# Patient Record
Sex: FEMALE | Race: WHITE | Employment: OTHER | URBAN - NONMETROPOLITAN AREA
[De-identification: names, ages, dates, MRNs, and addresses within clinical notes are randomized per-mention and may not be internally consistent; named-entity substitution may affect disease eponyms.]

---

## 2017-10-27 ENCOUNTER — HOSPITAL ENCOUNTER (OUTPATIENT)
Dept: MAMMOGRAPHY | Age: 67
Discharge: HOME | End: 2017-10-27
Payer: COMMERCIAL

## 2017-10-27 DIAGNOSIS — Z12.39 BREAST CANCER SCREENING: ICD-10-CM

## 2017-10-27 PROCEDURE — G0202 SCR MAMMO BI INCL CAD: HCPCS

## 2017-11-03 ENCOUNTER — HOSPITAL ENCOUNTER (OUTPATIENT)
Dept: WOMENS IMAGING | Age: 67
Discharge: HOME | End: 2017-11-03
Payer: COMMERCIAL

## 2017-11-03 DIAGNOSIS — R92.1 BREAST CALCIFICATIONS: ICD-10-CM

## 2017-11-03 PROCEDURE — G0279 TOMOSYNTHESIS, MAMMO: HCPCS

## 2018-10-12 ENCOUNTER — HOSPITAL ENCOUNTER (OUTPATIENT)
Dept: MAMMOGRAPHY | Age: 68
Discharge: HOME | End: 2018-10-12
Payer: COMMERCIAL

## 2018-10-12 DIAGNOSIS — Z12.39 BREAST CANCER SCREENING: ICD-10-CM

## 2018-10-12 PROCEDURE — 77067 SCR MAMMO BI INCL CAD: CPT

## 2019-02-06 ENCOUNTER — NURSE TRIAGE (OUTPATIENT)
Dept: ADMINISTRATIVE | Age: 69
End: 2019-02-06

## 2019-10-22 ENCOUNTER — HOSPITAL ENCOUNTER (OUTPATIENT)
Dept: MAMMOGRAPHY | Age: 69
Discharge: HOME | End: 2019-10-22
Payer: MEDICARE

## 2019-10-22 DIAGNOSIS — Z12.39 BREAST CANCER SCREENING: ICD-10-CM

## 2019-10-22 PROCEDURE — 77063 BREAST TOMOSYNTHESIS BI: CPT

## 2020-10-23 ENCOUNTER — HOSPITAL ENCOUNTER (OUTPATIENT)
Dept: MAMMOGRAPHY | Age: 70
Discharge: HOME | End: 2020-10-23
Payer: MEDICARE

## 2020-10-23 PROCEDURE — 77063 BREAST TOMOSYNTHESIS BI: CPT

## 2021-08-26 RX ORDER — ATORVASTATIN CALCIUM 20 MG/1
20 TABLET, FILM COATED ORAL NIGHTLY
COMMUNITY
Start: 2021-08-04

## 2021-08-26 RX ORDER — LISINOPRIL 20 MG/1
20 TABLET ORAL 2 TIMES DAILY
COMMUNITY
Start: 2021-07-01 | End: 2021-10-21

## 2021-08-31 ENCOUNTER — ANESTHESIA EVENT (OUTPATIENT)
Dept: OPERATING ROOM | Age: 71
End: 2021-08-31
Payer: MEDICARE

## 2021-08-31 ENCOUNTER — HOSPITAL ENCOUNTER (OUTPATIENT)
Age: 71
Setting detail: OUTPATIENT SURGERY
Discharge: HOME | End: 2021-08-31
Attending: OPHTHALMOLOGY | Admitting: OPHTHALMOLOGY
Payer: MEDICARE

## 2021-08-31 ENCOUNTER — ANESTHESIA (OUTPATIENT)
Dept: OPERATING ROOM | Age: 71
End: 2021-08-31
Payer: MEDICARE

## 2021-08-31 VITALS
HEART RATE: 61 BPM | BODY MASS INDEX: 32.62 KG/M2 | OXYGEN SATURATION: 98 % | HEIGHT: 66 IN | DIASTOLIC BLOOD PRESSURE: 60 MMHG | WEIGHT: 203 LBS | TEMPERATURE: 97 F | RESPIRATION RATE: 20 BRPM | SYSTOLIC BLOOD PRESSURE: 132 MMHG

## 2021-08-31 VITALS — OXYGEN SATURATION: 99 % | SYSTOLIC BLOOD PRESSURE: 151 MMHG | DIASTOLIC BLOOD PRESSURE: 66 MMHG

## 2021-08-31 PROBLEM — H35.342 MACULAR HOLE, LEFT EYE: Status: ACTIVE | Noted: 2021-08-31

## 2021-08-31 LAB
GFR NON-AFRICAN AMERICAN: 60 ML/MIN
GFR SERPL CREATININE-BSD FRML MDRD: >60 ML/MIN
GFR SERPL CREATININE-BSD FRML MDRD: ABNORMAL ML/MIN/{1.73_M2}
GLUCOSE BLD-MCNC: 104 MG/DL (ref 74–100)
POC CREATININE: 0.93 MG/DL (ref 0.51–1.19)

## 2021-08-31 PROCEDURE — 3700000001 HC ADD 15 MINUTES (ANESTHESIA): Performed by: OPHTHALMOLOGY

## 2021-08-31 PROCEDURE — 3700000000 HC ANESTHESIA ATTENDED CARE: Performed by: OPHTHALMOLOGY

## 2021-08-31 PROCEDURE — 6360000002 HC RX W HCPCS: Performed by: OPHTHALMOLOGY

## 2021-08-31 PROCEDURE — 2500000003 HC RX 250 WO HCPCS: Performed by: NURSE ANESTHETIST, CERTIFIED REGISTERED

## 2021-08-31 PROCEDURE — 3600000014 HC SURGERY LEVEL 4 ADDTL 15MIN: Performed by: OPHTHALMOLOGY

## 2021-08-31 PROCEDURE — 82565 ASSAY OF CREATININE: CPT

## 2021-08-31 PROCEDURE — 6360000002 HC RX W HCPCS: Performed by: NURSE ANESTHETIST, CERTIFIED REGISTERED

## 2021-08-31 PROCEDURE — 2580000003 HC RX 258: Performed by: ANESTHESIOLOGY

## 2021-08-31 PROCEDURE — 6370000000 HC RX 637 (ALT 250 FOR IP): Performed by: OPHTHALMOLOGY

## 2021-08-31 PROCEDURE — 3600000004 HC SURGERY LEVEL 4 BASE: Performed by: OPHTHALMOLOGY

## 2021-08-31 PROCEDURE — 82947 ASSAY GLUCOSE BLOOD QUANT: CPT

## 2021-08-31 PROCEDURE — 2500000003 HC RX 250 WO HCPCS: Performed by: OPHTHALMOLOGY

## 2021-08-31 PROCEDURE — 93005 ELECTROCARDIOGRAM TRACING: CPT | Performed by: ANESTHESIOLOGY

## 2021-08-31 PROCEDURE — 2580000003 HC RX 258: Performed by: OPHTHALMOLOGY

## 2021-08-31 PROCEDURE — 7100000041 HC SPAR PHASE II RECOVERY - ADDTL 15 MIN: Performed by: OPHTHALMOLOGY

## 2021-08-31 PROCEDURE — 2709999900 HC NON-CHARGEABLE SUPPLY: Performed by: OPHTHALMOLOGY

## 2021-08-31 PROCEDURE — 7100000040 HC SPAR PHASE II RECOVERY - FIRST 15 MIN: Performed by: OPHTHALMOLOGY

## 2021-08-31 RX ORDER — SODIUM CHLORIDE 0.9 % (FLUSH) 0.9 %
SYRINGE (ML) INJECTION PRN
Status: DISCONTINUED | OUTPATIENT
Start: 2021-08-31 | End: 2021-08-31 | Stop reason: ALTCHOICE

## 2021-08-31 RX ORDER — BALANCED SALT SOLUTION ENRICHED WITH BICARBONATE, DEXTROSE, AND GLUTATHIONE
KIT INTRAOCULAR PRN
Status: DISCONTINUED | OUTPATIENT
Start: 2021-08-31 | End: 2021-08-31 | Stop reason: ALTCHOICE

## 2021-08-31 RX ORDER — FENTANYL CITRATE 50 UG/ML
25 INJECTION, SOLUTION INTRAMUSCULAR; INTRAVENOUS EVERY 5 MIN PRN
Status: DISCONTINUED | OUTPATIENT
Start: 2021-08-31 | End: 2021-08-31 | Stop reason: HOSPADM

## 2021-08-31 RX ORDER — CYCLOPENTOLATE HYDROCHLORIDE 10 MG/ML
1 SOLUTION/ DROPS OPHTHALMIC EVERY 5 MIN PRN
Status: COMPLETED | OUTPATIENT
Start: 2021-08-31 | End: 2021-08-31

## 2021-08-31 RX ORDER — PROPOFOL 10 MG/ML
INJECTION, EMULSION INTRAVENOUS PRN
Status: DISCONTINUED | OUTPATIENT
Start: 2021-08-31 | End: 2021-08-31 | Stop reason: SDUPTHER

## 2021-08-31 RX ORDER — DEXTROSE MONOHYDRATE 25 G/50ML
INJECTION, SOLUTION INTRAVENOUS PRN
Status: DISCONTINUED | OUTPATIENT
Start: 2021-08-31 | End: 2021-08-31 | Stop reason: ALTCHOICE

## 2021-08-31 RX ORDER — CEFAZOLIN SODIUM 1 G/3ML
INJECTION, POWDER, FOR SOLUTION INTRAMUSCULAR; INTRAVENOUS PRN
Status: DISCONTINUED | OUTPATIENT
Start: 2021-08-31 | End: 2021-08-31 | Stop reason: ALTCHOICE

## 2021-08-31 RX ORDER — PHENYLEPHRINE HYDROCHLORIDE 100 MG/ML
1 SOLUTION/ DROPS OPHTHALMIC EVERY 5 MIN PRN
Status: COMPLETED | OUTPATIENT
Start: 2021-08-31 | End: 2021-08-31

## 2021-08-31 RX ORDER — FENTANYL CITRATE 50 UG/ML
50 INJECTION, SOLUTION INTRAMUSCULAR; INTRAVENOUS EVERY 5 MIN PRN
Status: DISCONTINUED | OUTPATIENT
Start: 2021-08-31 | End: 2021-08-31 | Stop reason: HOSPADM

## 2021-08-31 RX ORDER — SODIUM CHLORIDE, SODIUM LACTATE, POTASSIUM CHLORIDE, CALCIUM CHLORIDE 600; 310; 30; 20 MG/100ML; MG/100ML; MG/100ML; MG/100ML
INJECTION, SOLUTION INTRAVENOUS CONTINUOUS
Status: DISCONTINUED | OUTPATIENT
Start: 2021-08-31 | End: 2021-08-31 | Stop reason: HOSPADM

## 2021-08-31 RX ORDER — ERYTHROMYCIN 5 MG/G
OINTMENT OPHTHALMIC PRN
Status: DISCONTINUED | OUTPATIENT
Start: 2021-08-31 | End: 2021-08-31 | Stop reason: ALTCHOICE

## 2021-08-31 RX ORDER — LIDOCAINE HYDROCHLORIDE 10 MG/ML
INJECTION, SOLUTION EPIDURAL; INFILTRATION; INTRACAUDAL; PERINEURAL PRN
Status: DISCONTINUED | OUTPATIENT
Start: 2021-08-31 | End: 2021-08-31 | Stop reason: SDUPTHER

## 2021-08-31 RX ORDER — CYCLOPENTOLATE HYDROCHLORIDE 10 MG/ML
SOLUTION/ DROPS OPHTHALMIC PRN
Status: DISCONTINUED | OUTPATIENT
Start: 2021-08-31 | End: 2021-08-31 | Stop reason: ALTCHOICE

## 2021-08-31 RX ORDER — ONDANSETRON 2 MG/ML
4 INJECTION INTRAMUSCULAR; INTRAVENOUS
Status: DISCONTINUED | OUTPATIENT
Start: 2021-08-31 | End: 2021-08-31 | Stop reason: HOSPADM

## 2021-08-31 RX ORDER — MEPERIDINE HYDROCHLORIDE 50 MG/ML
12.5 INJECTION INTRAMUSCULAR; INTRAVENOUS; SUBCUTANEOUS EVERY 5 MIN PRN
Status: DISCONTINUED | OUTPATIENT
Start: 2021-08-31 | End: 2021-08-31 | Stop reason: HOSPADM

## 2021-08-31 RX ORDER — INDOCYANINE GREEN AND WATER 25 MG
KIT INJECTION PRN
Status: DISCONTINUED | OUTPATIENT
Start: 2021-08-31 | End: 2021-08-31 | Stop reason: ALTCHOICE

## 2021-08-31 RX ADMIN — SODIUM CHLORIDE, POTASSIUM CHLORIDE, SODIUM LACTATE AND CALCIUM CHLORIDE: 600; 310; 30; 20 INJECTION, SOLUTION INTRAVENOUS at 09:10

## 2021-08-31 RX ADMIN — LIDOCAINE HYDROCHLORIDE 50 MG: 10 INJECTION, SOLUTION EPIDURAL; INFILTRATION; INTRACAUDAL; PERINEURAL at 10:20

## 2021-08-31 RX ADMIN — PROPOFOL 20 MG: 10 INJECTION, EMULSION INTRAVENOUS at 10:23

## 2021-08-31 RX ADMIN — PHENYLEPHRINE HYDROCHLORIDE 1 DROP: 100 SOLUTION/ DROPS OPHTHALMIC at 09:04

## 2021-08-31 RX ADMIN — PROPOFOL 50 MG: 10 INJECTION, EMULSION INTRAVENOUS at 10:20

## 2021-08-31 RX ADMIN — PHENYLEPHRINE HYDROCHLORIDE 1 DROP: 100 SOLUTION/ DROPS OPHTHALMIC at 09:09

## 2021-08-31 RX ADMIN — CYCLOPENTOLATE HYDROCHLORIDE 1 DROP: 10 SOLUTION OPHTHALMIC at 09:12

## 2021-08-31 RX ADMIN — CYCLOPENTOLATE HYDROCHLORIDE 1 DROP: 10 SOLUTION OPHTHALMIC at 09:23

## 2021-08-31 RX ADMIN — CYCLOPENTOLATE HYDROCHLORIDE 1 DROP: 10 SOLUTION OPHTHALMIC at 09:19

## 2021-08-31 RX ADMIN — GENTAMICIN, PREDNISOLONE ACETATE 1 DROP: 3; 10 SUSPENSION/ DROPS OPHTHALMIC at 09:26

## 2021-08-31 RX ADMIN — PHENYLEPHRINE HYDROCHLORIDE 1 DROP: 100 SOLUTION/ DROPS OPHTHALMIC at 08:58

## 2021-08-31 RX ADMIN — PROPOFOL 20 MG: 10 INJECTION, EMULSION INTRAVENOUS at 10:21

## 2021-08-31 ASSESSMENT — PAIN SCALES - GENERAL
PAINLEVEL_OUTOF10: 0

## 2021-08-31 ASSESSMENT — PULMONARY FUNCTION TESTS
PIF_VALUE: 0
PIF_VALUE: 0

## 2021-08-31 ASSESSMENT — PAIN - FUNCTIONAL ASSESSMENT: PAIN_FUNCTIONAL_ASSESSMENT: 0-10

## 2021-08-31 NOTE — BRIEF OP NOTE
Brief Postoperative Note      Patient: Arely Priest  YOB: 1950  MRN: [id number]    Date of Procedure: 8/31/2021    Pre-Op Diagnosis: MACULAR HOLE    Post-Op Diagnosis: Same       Procedure(s):  VITRECTOMY 25 GAUGE, MEMBRANE PEEL, AIR FLUID EXCHANGE, ICG, AIR GAS EXCHANGE WITH 20% SF6    Surgeon(s):  Denise Castrejon MD    Assistant:  * No surgical staff found *    Anesthesia: Monitor Anesthesia Care    Estimated Blood Loss (mL): Minimal    Complications: None    Specimens:   * No specimens in log *    Implants:  * No implants in log *      Drains: * No LDAs found *    Findings: Same    Electronically signed by Denise Castrejon MD on 8/31/2021 at 11:05 AM

## 2021-08-31 NOTE — H&P
History and Physical    Pt Name: Vik Mukherjee  MRN: [id number]  YOB: 1950  Date of evaluation: 2021  Primary Care Physician: Trevin Mock    SUBJECTIVE:   History of Chief Complaint:    Vik Mukherjee is a 72 y.o. female who is scheduled today for left vitrectomy. She reports having a partial macular hole. She first noticed blurry vision at the end of 2021. She has since had bilateral cataract removal in 2021, and says this has cleared her vision some, but still has distorted vision on the left at times. Allergies  has No Known Allergies. Medications  Prior to Admission medications    Medication Sig Start Date End Date Taking? Authorizing Provider   ASPIRIN 81 PO Take 81 mg by mouth as needed   Yes Historical Provider, MD   atorvastatin (LIPITOR) 20 MG tablet Take 20 mg by mouth daily 21  Yes Historical Provider, MD   lisinopril (PRINIVIL;ZESTRIL) 20 MG tablet Take 20 mg by mouth 2 times daily 21  Yes Historical Provider, MD   FIBER COMPLETE PO Take by mouth as needed   Yes Historical Provider, MD   Probiotic Product (PROBIOTIC-10 PO) Take by mouth   Yes Historical Provider, MD     Past Medical History    has a past medical history of Cancer ([address] Hyperlipidemia, Hypertension, Pain of right heel, Partial thickness macular hole of left eye, Snoring, Vision changes, Wears glasses, and Wellness examination. Past Surgical History   has a past surgical history that includes Cataract removal (Bilateral); Total hip arthroplasty (Left); Colonoscopy; and Appendectomy. Social History   reports that she quit smoking about 2 years ago. Her smoking use included cigarettes. She started smoking about 50 years ago. She has never used smokeless tobacco.   reports previous alcohol use. reports no history of drug use.   Marital Status single  Children one living, one   Occupation retired clerical  Family History  Family Status   Relation Name Status    Mother      Father MEMBRANE PEEL, GAS FLUID EXCHANGE, ICG - Left    JIM FOWLER - CNP   Electronically signed 8/31/2021 at 9:16 AM

## 2021-08-31 NOTE — ANESTHESIA POSTPROCEDURE EVALUATION
Department of Anesthesiology  Postprocedure Note    Patient: Arely Priest  MRN: [id number]  YOB: 1950  Date of evaluation: 8/31/2021  Time:  1:30 PM     Procedure Summary     Date: 08/31/21 Room / Location: [address]    Anesthesia Start: 1466 Anesthesia Stop: 1112    Procedure: VITRECTOMY 25 GAUGE, MEMBRANE PEEL, AIR FLUID EXCHANGE, ICG, AIR GAS EXCHANGE WITH 20% SF6 (Left ) Diagnosis: (MACULAR HOLE)    Surgeons: Denise Castrejon MD Responsible Provider: Kelli Davis MD    Anesthesia Type: MAC ASA Status: 3          Anesthesia Type: MAC    Michael Phase I:      Michael Phase II: Michael Score: 10    Last vitals: Reviewed and per EMR flowsheets.      POST-OP ANESTHESIA NOTE       /60   Pulse 61   Temp 97 °F (36.1 °C)   Resp 20   Ht 5' 6\" (1.676 m)   Wt 203 lb (92.1 kg)   SpO2 98%   BMI 32.77 kg/m²    Pain Assessment: 0-10  Pain Level: 0          Anesthesia Post Evaluation    Patient location during evaluation: PACU  Patient participation: complete - patient participated  Level of consciousness: awake  Pain score: 0  Airway patency: patent  Nausea & Vomiting: no vomiting and no nausea  Complications: no  Cardiovascular status: hemodynamically stable  Respiratory status: acceptable  Hydration status: stable

## 2021-08-31 NOTE — OP NOTE
[address] [facility] [address]                                OPERATIVE REPORT    PATIENT NAME: Nela Callejas                    :        1950  MED REC NO:   [id number]                             ROOM:  ACCOUNT NO:   [de-identified]                           ADMIT DATE: 2021  PROVIDER:     Jaida Coleman    DATE OF PROCEDURE:  2021    PREOPERATIVE DIAGNOSES:  1. Macular hole, left eye. 2.  Pseudophakia left eye. POSTOPERATIVE DIAGNOSES:  1. Macular hole, left eye. 2.  Pseudophakia left eye. SURGEON:  Dr. Kayla Carter. ANESTHESIA:  Local monitored anesthesia care. COMPLICATIONS:  None. BLOOD LOSS:  Minimal.    SPECIMEN:  None taken. SURGERIES PERFORMED:  1.  Pars plana vitrectomy. 2.  Membrane stripping and membranectomy under ICG guidance. 3.  Fluid-air exchange. 4.  Air-gas exchange, 20% SF6 left eye. JUSTIFICATION:  This is a very pleasant female, has a history of  decreased vision in the left eye created by a macular hole. The patient  desired an attempt to visually rehabilitate the left eye through  vitrectomy surgery. The patient was given informed consent, was taken  to the operating room and was given IV sedation. The patient then  received a retrobulbar block with a 50/50 mixture of Marcaine and  lidocaine. After good akinesia and anesthesia was established, the  patient was prepped and draped in the usual sterile fashion. A wire lid  speculum was used to open up the lids of the left eye. 25-gauge trocars  were placed in the temporal and superonasal quadrants, 3 mm posterior to  the limbus, an infusion cannula was placed in the inferotemporal trocar  and directly visually inspected prior to being turned on. Core  vitrectomy was performed.   The posterior hyaloid was gently dissected  from the surface of the optic nerve and macula and the vitrectomy was  performed out to the limits of visualization. ICG dye was then used to  stain the internal limiting membrane for 2 minutes and was removed from  the eye. A Ashvin brush was used to crack the internal limiting membrane,  which was removed for a disk diameter in half around the fovea  circumferentially. The patient's peripheral retina was inspected and  found to be free of breaks, tears, holes, or detachment. A fluid-air  exchange was performed. 10 minutes were allowed to elapse and the last  of preretinal fluid was vacuumed from the surface of the retina. The  patient then had an air-gas exchange performed with a 20% non-expansile  mixture of SF6. The patient's trocars removed, and the patient's  pressure was left normal to palpation. The patient's operation was  terminated by placing Ancef solution over the left eye. Erythromycin  ointment, and Cyclogyl drops were placed on the left eye. Patch and  shield placed on the left eye. The patient was taken to the recovery  room in good condition having tolerated the procedure well and without  complication.           Julissa DAILY    D: 08/31/2021 11:20:54       T: 08/31/2021 11:24:26     [email or web address]  Job#: [id number]     Doc#: [id number]    CC:

## 2021-08-31 NOTE — ANESTHESIA PRE PROCEDURE
Department of Anesthesiology  Preprocedure Note       Name:  Billy Walton   Age:  72 y.o.  :  1950                                          MRN:  [id number]         Date:  2021      Surgeon: Rose Pfeiffer):  Tyra Wen MD    Procedure: Procedure(s):  VITRECTOMY 25 GAUGE, MEMBRANE PEEL, GAS FLUID EXCHANGE, ICG    Medications prior to admission:   Prior to Admission medications    Medication Sig Start Date End Date Taking?  Authorizing Provider   ASPIRIN 81 PO Take 81 mg by mouth as needed   Yes Historical Provider, MD   atorvastatin (LIPITOR) 20 MG tablet Take 20 mg by mouth daily 21  Yes Historical Provider, MD   lisinopril (PRINIVIL;ZESTRIL) 20 MG tablet Take 20 mg by mouth 2 times daily 21  Yes Historical Provider, MD   FIBER COMPLETE PO Take by mouth as needed   Yes Historical Provider, MD   Probiotic Product (PROBIOTIC-10 PO) Take by mouth   Yes Historical Provider, MD       Current medications:    Current Facility-Administered Medications   Medication Dose Route Frequency Provider Last Rate Last Admin    lactated ringers infusion   IntraVENous Continuous [place]  mL/hr at 21 0910 New Bag at 21 0910       Allergies:  No Known Allergies    Problem List:    Patient Active Problem List   Diagnosis Code    Macular hole, left eye H35.342       Past Medical History:        Diagnosis Date    Cancer ([facility] [address]     skin    Hyperlipidemia     Hypertension     Pain of right heel     Partial thickness macular hole of left eye     Snoring     Vision changes     left    Wears glasses     READING    Wellness examination 2021    Dr. Dexter Cespedes, [address]       Past Surgical History:        Procedure Laterality Date    APPENDECTOMY      CATARACT REMOVAL Bilateral     COLONOSCOPY      TOTAL HIP ARTHROPLASTY Left        Social History:    Social History     Tobacco Use    Smoking status: Former Smoker     Types: Cigarettes     Start date:  Quit date: 2019     Years since quittin.6    Smokeless tobacco: Never Used   Substance Use Topics    Alcohol use: Not Currently     Comment: very rarely                                Counseling given: Not Answered      Vital Signs (Current):   Vitals:    21 1255 21 0852   BP:  (!) 158/71   Pulse:  76   Resp:  16   Temp:  96.3 °F (35.7 °C)   TempSrc:  Temporal   SpO2:  100%   Weight: 203 lb (92.1 kg) 203 lb (92.1 kg)   Height: 5' 6\" (1.676 m) 5' 6\" (1.676 m)                                              BP Readings from Last 3 Encounters:   21 (!) 158/71       NPO Status: Time of last liquid consumption: 530 (took sip of water with meds)                        Time of last solid consumption:                         Date of last liquid consumption: 21                        Date of last solid food consumption: 21    BMI:   Wt Readings from Last 3 Encounters:   21 203 lb (92.1 kg)     Body mass index is 32.77 kg/m².     CBC: No results found for: WBC, RBC, HGB, HCT, MCV, RDW, PLT    CMP:   Lab Results   Component Value Date    CREATININE 0.93 2021    LABGLOM 60 2021       POC Tests:   Recent Labs     21  0910   POCGLU 104*       Coags: No results found for: PROTIME, INR, APTT    HCG (If Applicable): No results found for: PREGTESTUR, PREGSERUM, HCG, HCGQUANT     ABGs: No results found for: PHART, PO2ART, QMY4LQY, IEQ9DFS, BEART, W6KSEGTX     Type & Screen (If Applicable):  No results found for: LABABO, LABRH    Drug/Infectious Status (If Applicable):  No results found for: HIV, HEPCAB    COVID-19 Screening (If Applicable): No results found for: COVID19        Anesthesia Evaluation    Airway: Mallampati: III  TM distance: >3 FB     Mouth opening: > = 3 FB Dental:    (+) other and implants      Pulmonary:Negative Pulmonary ROS and normal exam                               Cardiovascular:    (+) hypertension:,                   Neuro/Psych:   Negative Neuro/Psych ROS              GI/Hepatic/Renal: Neg GI/Hepatic/Renal ROS            Endo/Other: Negative Endo/Other ROS                    Abdominal:   (+) obese,           Vascular: negative vascular ROS. Other Findings:           Anesthesia Plan      MAC     ASA 3       Induction: intravenous. MIPS: Postoperative opioids intended. Anesthetic plan and risks discussed with patient and sibling. Plan discussed with CRNA.                 Faisal Mehta MD   8/31/2021

## 2021-09-01 LAB
EKG ATRIAL RATE: 61 BPM
EKG P AXIS: 55 DEGREES
EKG P-R INTERVAL: 184 MS
EKG Q-T INTERVAL: 410 MS
EKG QRS DURATION: 70 MS
EKG QTC CALCULATION (BAZETT): 412 MS
EKG R AXIS: -2 DEGREES
EKG T AXIS: 16 DEGREES
EKG VENTRICULAR RATE: 61 BPM

## 2021-09-01 PROCEDURE — 93010 ELECTROCARDIOGRAM REPORT: CPT | Performed by: INTERNAL MEDICINE

## 2021-10-21 RX ORDER — ACETAMINOPHEN 160 MG
2000 TABLET,DISINTEGRATING ORAL DAILY
COMMUNITY

## 2021-10-21 RX ORDER — OMEGA-3 FATTY ACIDS CAP DELAYED RELEASE 1000 MG 1000 MG
1000 CAPSULE DELAYED RELEASE ORAL DAILY
COMMUNITY

## 2021-10-26 ENCOUNTER — ANESTHESIA EVENT (OUTPATIENT)
Dept: OPERATING ROOM | Age: 71
End: 2021-10-26
Payer: MEDICARE

## 2021-10-26 ENCOUNTER — ANESTHESIA (OUTPATIENT)
Dept: OPERATING ROOM | Age: 71
End: 2021-10-26
Payer: MEDICARE

## 2021-10-26 ENCOUNTER — HOSPITAL ENCOUNTER (OUTPATIENT)
Age: 71
Setting detail: OUTPATIENT SURGERY
Discharge: HOME | End: 2021-10-26
Attending: OPHTHALMOLOGY | Admitting: OPHTHALMOLOGY
Payer: MEDICARE

## 2021-10-26 VITALS
RESPIRATION RATE: 19 BRPM | SYSTOLIC BLOOD PRESSURE: 144 MMHG | TEMPERATURE: 97.3 F | HEART RATE: 66 BPM | OXYGEN SATURATION: 99 % | BODY MASS INDEX: 32.47 KG/M2 | HEIGHT: 66 IN | DIASTOLIC BLOOD PRESSURE: 70 MMHG | WEIGHT: 202 LBS

## 2021-10-26 VITALS
DIASTOLIC BLOOD PRESSURE: 66 MMHG | SYSTOLIC BLOOD PRESSURE: 137 MMHG | OXYGEN SATURATION: 100 % | RESPIRATION RATE: 12 BRPM

## 2021-10-26 PROCEDURE — 3700000001 HC ADD 15 MINUTES (ANESTHESIA): Performed by: OPHTHALMOLOGY

## 2021-10-26 PROCEDURE — 2709999900 HC NON-CHARGEABLE SUPPLY: Performed by: OPHTHALMOLOGY

## 2021-10-26 PROCEDURE — 6360000002 HC RX W HCPCS: Performed by: OPHTHALMOLOGY

## 2021-10-26 PROCEDURE — 2720000010 HC SURG SUPPLY STERILE: Performed by: OPHTHALMOLOGY

## 2021-10-26 PROCEDURE — 7100000040 HC SPAR PHASE II RECOVERY - FIRST 15 MIN: Performed by: OPHTHALMOLOGY

## 2021-10-26 PROCEDURE — 2500000003 HC RX 250 WO HCPCS: Performed by: NURSE ANESTHETIST, CERTIFIED REGISTERED

## 2021-10-26 PROCEDURE — 6360000002 HC RX W HCPCS: Performed by: NURSE ANESTHETIST, CERTIFIED REGISTERED

## 2021-10-26 PROCEDURE — 6370000000 HC RX 637 (ALT 250 FOR IP): Performed by: OPHTHALMOLOGY

## 2021-10-26 PROCEDURE — 2580000003 HC RX 258: Performed by: OPHTHALMOLOGY

## 2021-10-26 PROCEDURE — 3700000000 HC ANESTHESIA ATTENDED CARE: Performed by: OPHTHALMOLOGY

## 2021-10-26 PROCEDURE — 2500000003 HC RX 250 WO HCPCS: Performed by: OPHTHALMOLOGY

## 2021-10-26 PROCEDURE — 3600000004 HC SURGERY LEVEL 4 BASE: Performed by: OPHTHALMOLOGY

## 2021-10-26 PROCEDURE — 7100000041 HC SPAR PHASE II RECOVERY - ADDTL 15 MIN: Performed by: OPHTHALMOLOGY

## 2021-10-26 PROCEDURE — 3600000014 HC SURGERY LEVEL 4 ADDTL 15MIN: Performed by: OPHTHALMOLOGY

## 2021-10-26 RX ORDER — SODIUM CHLORIDE, SODIUM LACTATE, POTASSIUM CHLORIDE, CALCIUM CHLORIDE 600; 310; 30; 20 MG/100ML; MG/100ML; MG/100ML; MG/100ML
INJECTION, SOLUTION INTRAVENOUS CONTINUOUS
Status: DISCONTINUED | OUTPATIENT
Start: 2021-10-26 | End: 2021-10-26 | Stop reason: HOSPADM

## 2021-10-26 RX ORDER — CEFAZOLIN SODIUM 1 G/3ML
INJECTION, POWDER, FOR SOLUTION INTRAMUSCULAR; INTRAVENOUS PRN
Status: DISCONTINUED | OUTPATIENT
Start: 2021-10-26 | End: 2021-10-26 | Stop reason: ALTCHOICE

## 2021-10-26 RX ORDER — LIDOCAINE HYDROCHLORIDE 10 MG/ML
INJECTION, SOLUTION EPIDURAL; INFILTRATION; INTRACAUDAL; PERINEURAL PRN
Status: DISCONTINUED | OUTPATIENT
Start: 2021-10-26 | End: 2021-10-26 | Stop reason: SDUPTHER

## 2021-10-26 RX ORDER — CYCLOPENTOLATE HYDROCHLORIDE 10 MG/ML
1 SOLUTION/ DROPS OPHTHALMIC EVERY 5 MIN PRN
Status: COMPLETED | OUTPATIENT
Start: 2021-10-26 | End: 2021-10-26

## 2021-10-26 RX ORDER — DEXTROSE MONOHYDRATE 25 G/50ML
INJECTION, SOLUTION INTRAVENOUS PRN
Status: DISCONTINUED | OUTPATIENT
Start: 2021-10-26 | End: 2021-10-26 | Stop reason: ALTCHOICE

## 2021-10-26 RX ORDER — BALANCED SALT SOLUTION ENRICHED WITH BICARBONATE, DEXTROSE, AND GLUTATHIONE
KIT INTRAOCULAR PRN
Status: DISCONTINUED | OUTPATIENT
Start: 2021-10-26 | End: 2021-10-26 | Stop reason: ALTCHOICE

## 2021-10-26 RX ORDER — PHENYLEPHRINE HYDROCHLORIDE 100 MG/ML
1 SOLUTION/ DROPS OPHTHALMIC EVERY 5 MIN PRN
Status: COMPLETED | OUTPATIENT
Start: 2021-10-26 | End: 2021-10-26

## 2021-10-26 RX ORDER — PROPOFOL 10 MG/ML
INJECTION, EMULSION INTRAVENOUS PRN
Status: DISCONTINUED | OUTPATIENT
Start: 2021-10-26 | End: 2021-10-26 | Stop reason: SDUPTHER

## 2021-10-26 RX ORDER — CYCLOPENTOLATE HYDROCHLORIDE 10 MG/ML
SOLUTION/ DROPS OPHTHALMIC PRN
Status: DISCONTINUED | OUTPATIENT
Start: 2021-10-26 | End: 2021-10-26 | Stop reason: ALTCHOICE

## 2021-10-26 RX ORDER — INDOCYANINE GREEN AND WATER 25 MG
KIT INJECTION PRN
Status: DISCONTINUED | OUTPATIENT
Start: 2021-10-26 | End: 2021-10-26 | Stop reason: ALTCHOICE

## 2021-10-26 RX ORDER — ERYTHROMYCIN 5 MG/G
OINTMENT OPHTHALMIC PRN
Status: DISCONTINUED | OUTPATIENT
Start: 2021-10-26 | End: 2021-10-26 | Stop reason: ALTCHOICE

## 2021-10-26 RX ADMIN — GENTAMICIN, PREDNISOLONE ACETATE 1 DROP: 3; 10 SUSPENSION/ DROPS OPHTHALMIC at 09:22

## 2021-10-26 RX ADMIN — SODIUM CHLORIDE, POTASSIUM CHLORIDE, SODIUM LACTATE AND CALCIUM CHLORIDE: 600; 310; 30; 20 INJECTION, SOLUTION INTRAVENOUS at 09:14

## 2021-10-26 RX ADMIN — LIDOCAINE HYDROCHLORIDE 50 MG: 10 INJECTION, SOLUTION EPIDURAL; INFILTRATION; INTRACAUDAL; PERINEURAL at 10:09

## 2021-10-26 RX ADMIN — CYCLOPENTOLATE HYDROCHLORIDE 1 DROP: 10 SOLUTION/ DROPS OPHTHALMIC at 09:10

## 2021-10-26 RX ADMIN — CYCLOPENTOLATE HYDROCHLORIDE 1 DROP: 10 SOLUTION/ DROPS OPHTHALMIC at 09:22

## 2021-10-26 RX ADMIN — PHENYLEPHRINE HYDROCHLORIDE 1 DROP: 100 SOLUTION/ DROPS OPHTHALMIC at 09:05

## 2021-10-26 RX ADMIN — PHENYLEPHRINE HYDROCHLORIDE 1 DROP: 100 SOLUTION/ DROPS OPHTHALMIC at 09:22

## 2021-10-26 RX ADMIN — PHENYLEPHRINE HYDROCHLORIDE 1 DROP: 100 SOLUTION/ DROPS OPHTHALMIC at 09:10

## 2021-10-26 RX ADMIN — PROPOFOL 50 MG: 10 INJECTION, EMULSION INTRAVENOUS at 10:09

## 2021-10-26 RX ADMIN — CYCLOPENTOLATE HYDROCHLORIDE 1 DROP: 10 SOLUTION/ DROPS OPHTHALMIC at 09:05

## 2021-10-26 ASSESSMENT — PULMONARY FUNCTION TESTS
PIF_VALUE: 0

## 2021-10-26 ASSESSMENT — PAIN SCALES - GENERAL
PAINLEVEL_OUTOF10: 0

## 2021-10-26 ASSESSMENT — PAIN - FUNCTIONAL ASSESSMENT: PAIN_FUNCTIONAL_ASSESSMENT: 0-10

## 2021-10-26 NOTE — H&P
History and Physical    Pt Name: Abran Jean Baptiste  MRN: [id number]  YOB: 1950  Date of evaluation: 10/26/2021    SUBJECTIVE:   History of Chief Complaint:    Patient presents preprocedure for vitrectomy. She reports a partial macular hole, vitrectomy in August.  She says that she noticed blurred vision initially earlier this year. Patient then had cataract extraction several months later. She says that the macular hole \"filled\" somewhat after her vitrectomy in August but then returned. Currently she reports distortion and blurred vision on the left. She has been scheduled for vitrectomy today. Past Medical History    has a past medical history of Cancer ([facility] [address] Hyperlipidemia, Hypertension, Pain of right heel, Partial thickness macular hole of left eye, Snoring, Vision changes, Wears glasses, and Wellness examination. Past Surgical History   has a past surgical history that includes Colonoscopy; Appendectomy; vitrectomy (Left, 08/31/2021); Tubal ligation; Intracapsular cataract extraction (Bilateral); and joint replacement (Left). Medications  Prior to Admission medications    Medication Sig Start Date End Date Taking? Authorizing Provider   Omega-3 Fatty Acids (FISH OIL) 1000 MG CPDR Take 1,000 mg by mouth daily   Yes Historical Provider, MD   Cholecalciferol (VITAMIN D3) 50 MCG (2000 UT) CAPS Take 2,000 Units by mouth daily   Yes Historical Provider, MD   ASPIRIN 81 PO Take 81 mg by mouth daily Pt. Stopped 10-15-21 for OR 10-26-21/ Mgmt. Geovanny Spice CNP   Yes Historical Provider, MD   atorvastatin (LIPITOR) 20 MG tablet Take 20 mg by mouth nightly  8/4/21  Yes Historical Provider, MD   FIBER COMPLETE PO Take 1 tablet by mouth daily as needed    Yes Historical Provider, MD   Probiotic Product (PROBIOTIC-10 PO) Take 1 tablet by mouth daily    Yes Historical Provider, MD     Allergies  has No Known Allergies.   Family History  family history includes Dementia in her mother; Heart Disease in her wheezes. CARDIOVASCULAR: Heart sounds are normal.  Regular rate and rhythm without murmur. ABDOMEN: soft, non tender, non distended. EXTREMITIES: no edema bilateral lower extremities. IMPRESSIONS:   1. Macular hole left eye  2.  has a past medical history of Cancer ([address] Hyperlipidemia, Hypertension, Pain of right heel, Partial thickness macular hole of left eye, Snoring, Vision changes, Wears glasses, and Wellness examination (03/2021). PLANS:   1.  Left vitrectomy    IVETH Tompkins PA-C  Electronically signed 10/26/2021 at 9:21 AM

## 2021-10-26 NOTE — BRIEF OP NOTE
Brief Postoperative Note      Patient: Heidi Brown  YOB: 1950  MRN: [id number]    Date of Procedure: 10/26/2021    Pre-Op Diagnosis: MACULAR HOLE LEFT EYE    Post-Op Diagnosis: Same       Procedure(s):  VITRECTOMY 25 GAUGE, MEMBRANE PEEL, AIR FLUID EXCHANGE, ICG, AIR GAS EXCHANGE WITH 16% C3F8    Surgeon(s):  Trell Stratton MD    Assistant:  * No surgical staff found *    Anesthesia: Monitor Anesthesia Care    Estimated Blood Loss (mL): Minimal    Complications: None    Specimens:   * No specimens in log *    Implants:  * No implants in log *      Drains: * No LDAs found *    Findings: Same    Electronically signed by Trell Stratton MD on 10/26/2021 at 11:05 AM

## 2021-10-26 NOTE — ANESTHESIA POSTPROCEDURE EVALUATION
POST- ANESTHESIA EVALUATION       Pt Name: Lisandra Guerra  MRN: [id number]  YOB: 1950  Date of evaluation: 10/26/2021  Time:  12:05 PM      BP (!) 144/70   Pulse 66   Temp 97.3 °F (36.3 °C)   Resp 19   Ht 5' 6\" (1.676 m)   Wt 202 lb (91.6 kg)   SpO2 99%   BMI 32.60 kg/m²      Consciousness Level  Awake  Cardiopulmonary Status  Stable  Pain Adequately Treated YES  Nausea / Vomiting  NO  Adequate Hydration  YES  Anesthesia Related Complications NONE      Electronically signed by Marion Queen MD on 10/26/2021 at 12:05 PM       Department of Anesthesiology  Postprocedure Note    Patient: Lisandra Guerra  MRN: [id number]  [place]: 1950  Date of evaluation: 10/26/2021  Time:  12:05 PM     Procedure Summary     Date: 10/26/21 Room / Location: [facility] 05 / [facility]    Anesthesia Start: 1004 Anesthesia Stop: 1107    Procedure: VITRECTOMY 25 GAUGE, MEMBRANE PEEL, AIR FLUID EXCHANGE, ICG, AIR GAS EXCHANGE WITH 16% C3F8 (Left ) Diagnosis: (MACULAR HOLE LEFT EYE)    Surgeons: Angel Luis Green MD Responsible Provider: Marion Queen MD    Anesthesia Type: MAC ASA Status: 2          Anesthesia Type: MAC    Michael Phase I:      Michael Phase II: Michael Score: 10    Last vitals: Reviewed and per EMR flowsheets.        Anesthesia Post Evaluation

## 2021-10-26 NOTE — OP NOTE
[address] [facility] [address]                                OPERATIVE REPORT    PATIENT NAME: Luis Felipe Nathan                    :        1950  MED REC NO:   [id number]                             ROOM:  ACCOUNT NO:   [de-identified]                           ADMIT DATE: 10/26/2021  PROVIDER:     Manoj Coleman    DATE OF PROCEDURE:  10/26/2021    PREOPERATIVE DIAGNOSIS:  Macular hole, recurrent, left eye. POSTOPERATIVE DIAGNOSIS:  Macular hole, recurrent, left eye. SURGERY PERFORMED:  1.  Pars plana vitrectomy. 2.  Membrane stripping under ICG guidance. 3.  Fluid-air exchange. 4.  Air C3F8 16% exchange, left eye. SURGEON:  Adria Zhao MD    COMPLICATIONS:  None. BLOOD LOSS:  Minimal.    SPECIMEN:  None. JUSTIFICATION:  This very pleasant 18-year-old woman has a history of  macular hole formation in the left eye, which was treated with  vitrectomy surgery. The patient's macular hole was initially closed,  but then reopened and she desired an attempt to close the macular hole  and try to improve the vision with intended additional operation. The patient was taken to the operating room where IV sedation was given  and a retrobulbar block was applied with a 50:50 mixture of Marcaine and  lidocaine. After good akinesia and anesthesia were established, the  patient was prepped and draped in the usual sterile fashion. A wire lid  speculum was used to open up the lids of the left eye. A 25-gauge  trocars were placed in the temporal and superonasal quadrants  approximately 3.5 mm posterior to the limbus. The infusion cannula was  placed in the inferotemporal trocar site and directly visually inspected  prior to being turned on. The peripheral vitreous skirt was trimmed to  the limits of visualization. ICG dye was used to stain the internal  limiting membrane and the dye was removed after 2 minutes. The  patient's internal limiting membrane was removed out beyond the  superotemporal and inferotemporal arcades to the disc and approximately  3 disc diameters temporal to the disc to remove traction causing the  macular hole to remain open. The peripheral retina was inspected. No  peripheral breaks, tears, holes or detachment were seen. A fluid-air  exchange was performed and 10 minutes were allowed to elapse, as the  last of the preretinal fluid accumulated over the optic nerve. This was  then removed, and then a gas-gas exchange was performed with a  non-expansile 16% mixture of C3F8. The patient then had each of the  trocars removed. Each trocar site was rendered airtight by pinching it  with a pair of toothed forceps and the patient's posterior chamber lens  remained well centered throughout the operation. The patient received  Ancef solution over the left eye. Erythromycin ointment and Cyclogyl  drops were placed over the left eye and a patch and shield were placed  over the left eye. The patient was discharged the recovery room having  tolerated the procedure well without complication and with positioning  instructions.         Janel DAILY    D: 10/26/2021 11:20:29       T: 10/26/2021 11:25:30     [email or web address]  Job#: [id number]     Doc#: [id number]    CC:

## 2021-10-26 NOTE — ANESTHESIA PRE PROCEDURE
Department of Anesthesiology  Preprocedure Note       Name:  Umm Pro   Age:  72 y.o.  :  1950                                          MRN:  [id number]         Date:  10/26/2021      Surgeon: Delroy Michael):  Anai Mckeon MD    Procedure: Procedure(s):  VITRECTOMY 25 GAUGE, MEMBRANE PEEL, GAS FLUID EXCHANGE, ICG    Medications prior to admission:   Prior to Admission medications    Medication Sig Start Date End Date Taking? Authorizing Provider   Omega-3 Fatty Acids (FISH OIL) 1000 MG CPDR Take 1,000 mg by mouth daily   Yes Historical Provider, MD   Cholecalciferol (VITAMIN D3) 50 MCG (2000) CAPS Take 2,000 Units by mouth daily   Yes Historical Provider, MD   ASPIRIN 81 PO Take 81 mg by mouth daily Pt. Stopped 10-15-21 for OR 10-26-21/ Mgmt.  Aldon Annandale CNP   Yes Historical Provider, MD   atorvastatin (LIPITOR) 20 MG tablet Take 20 mg by mouth nightly  21  Yes Historical Provider, MD   FIBER COMPLETE PO Take 1 tablet by mouth daily as needed    Yes Historical Provider, MD   Probiotic Product (PROBIOTIC-10 PO) Take 1 tablet by mouth daily    Yes Historical Provider, MD       Current medications:    Current Facility-Administered Medications   Medication Dose Route Frequency Provider Last Rate Last Admin    phenylephrine (KIRK-SYNEPHRINE) 10 % ophthalmic solution 1 drop  1 drop Left Eye Q5 Min PRN Anai Mckeon MD        gentamicin-prednisoLONE 0.3-1 % ophthalmic drops 1 drop  1 drop Left Eye On Call to [id number], [place]        cyclopentolate (CYCLOGYL) 1 % ophthalmic solution 1 drop  1 drop Left Eye Q5 Min PRN Anai Mckeon MD           Allergies:  No Known Allergies    Problem List:    Patient Active Problem List   Diagnosis Code    Macular hole, left eye H35.342       Past Medical History:        Diagnosis Date    Cancer ([facility] [address]     skin    Hyperlipidemia     Hypertension     Pain of right heel     Partial thickness macular hole of left eye     Snoring     denies apnea    Vision changes     left    Wears glasses     READING    Wellness examination 2021    Dr. [facility]/PCP/ last seen        Past Surgical History:        Procedure Laterality Date    APPENDECTOMY      COLONOSCOPY      EYE SURGERY Bilateral     cataract    JOINT REPLACEMENT Left     hip    TUBAL LIGATION      VITRECTOMY Left 2021    VITRECTOMY Left 2021    VITRECTOMY 25 GAUGE, MEMBRANE PEEL, AIR FLUID EXCHANGE, ICG, AIR GAS EXCHANGE WITH 20% SF6 performed by Pepito Marshall MD at [facility]. History:    Social History     Tobacco Use    Smoking status: Former Smoker     Types: Cigarettes     Start date:      Quit date: 2019     Years since quittin.8    Smokeless tobacco: Never Used   Substance Use Topics    Alcohol use: Yes     Comment: rare                                Counseling given: Not Answered      Vital Signs (Current):   Vitals:    10/21/21 1029   Weight: 202 lb (91.6 kg)   Height: 5' 6\" (1.676 m)                                              BP Readings from Last 3 Encounters:   21 132/60   21 (!) 151/66       NPO Status: Time of last liquid consumption:                         Time of last solid consumption:                         Date of last liquid consumption: 10/25/21                        Date of last solid food consumption: 10/25/21    BMI:   Wt Readings from Last 3 Encounters:   10/21/21 202 lb (91.6 kg)   21 203 lb (92.1 kg)     Body mass index is 32.6 kg/m². CBC: No results found for: WBC, RBC, HGB, HCT, MCV, RDW, PLT    CMP:   Lab Results   Component Value Date    CREATININE 0.93 2021    LABGLOM 60 2021       POC Tests: No results for input(s): POCGLU, POCNA, POCK, POCCL, POCBUN, POCHEMO, POCHCT in the last 72 hours.     Coags: No results found for: PROTIME, INR, APTT    HCG (If Applicable): No results found for: PREGTESTUR, PREGSERUM, HCG, HCGQUANT     ABGs: No results found for: PHART, PO2ART, RIE3FUS, VIU9UCE, BEART, M7ROXMXL     Type & Screen (If Applicable):  No results found for: LABABO, LABRH    Drug/Infectious Status (If Applicable):  No results found for: HIV, HEPCAB    COVID-19 Screening (If Applicable): No results found for: COVID19        Anesthesia Evaluation  Patient summary reviewed and Nursing notes reviewed no history of anesthetic complications:   Airway: Mallampati: III  TM distance: >3 FB   Neck ROM: full  Mouth opening: > = 3 FB Dental: normal exam         Pulmonary:Negative Pulmonary ROS and normal exam                               Cardiovascular:    (+) hypertension:, hyperlipidemia                  Neuro/Psych:   Negative Neuro/Psych ROS              GI/Hepatic/Renal: Neg GI/Hepatic/Renal ROS            Endo/Other: Negative Endo/Other ROS                    Abdominal:             Vascular: Other Findings:           Anesthesia Plan      MAC     ASA 2       Induction: intravenous. MIPS: Postoperative opioids intended and Prophylactic antiemetics administered. Anesthetic plan and risks discussed with patient. Plan discussed with CRNA.                   Mahesh Noriega MD   10/26/2021

## 2021-10-29 ENCOUNTER — HOSPITAL ENCOUNTER (OUTPATIENT)
Dept: MAMMOGRAPHY | Age: 71
Discharge: HOME | End: 2021-10-29
Payer: MEDICARE

## 2021-10-29 DIAGNOSIS — Z12.39 ENCOUNTER FOR OTHER SCREENING FOR MALIGNANT NEOPLASM OF BREAST: ICD-10-CM

## 2021-10-29 PROCEDURE — 77063 BREAST TOMOSYNTHESIS BI: CPT

## 2022-11-04 ENCOUNTER — HOSPITAL ENCOUNTER (OUTPATIENT)
Dept: MAMMOGRAPHY | Age: 72
Discharge: HOME | End: 2022-11-04
Payer: MEDICARE

## 2022-11-04 DIAGNOSIS — Z12.31 VISIT FOR SCREENING MAMMOGRAM: ICD-10-CM

## 2022-11-04 PROCEDURE — 77067 SCR MAMMO BI INCL CAD: CPT

## 2023-11-06 ENCOUNTER — HOSPITAL ENCOUNTER (OUTPATIENT)
Dept: MAMMOGRAPHY | Age: 73
Discharge: HOME | End: 2023-11-06
Payer: MEDICARE

## 2023-11-06 VITALS — BODY MASS INDEX: 33.75 KG/M2 | HEIGHT: 66 IN | WEIGHT: 210 LBS

## 2023-11-06 DIAGNOSIS — Z12.31 VISIT FOR SCREENING MAMMOGRAM: ICD-10-CM

## 2023-11-06 PROCEDURE — 77063 BREAST TOMOSYNTHESIS BI: CPT

## 2024-11-08 ENCOUNTER — HOSPITAL ENCOUNTER (OUTPATIENT)
Dept: MAMMOGRAPHY | Age: 74
Discharge: HOME | End: 2024-11-08
Payer: MEDICARE

## 2024-11-08 VITALS — BODY MASS INDEX: 33.75 KG/M2 | WEIGHT: 210 LBS | HEIGHT: 66 IN

## 2024-11-08 DIAGNOSIS — Z12.31 VISIT FOR SCREENING MAMMOGRAM: ICD-10-CM

## 2024-11-08 PROCEDURE — 77063 BREAST TOMOSYNTHESIS BI: CPT

## (undated) DEVICE — NEEDLE FLTR 18GA L1.5IN MEM THK5UM BLNT DISP

## (undated) DEVICE — GLOVE ORANGE PI 7 1/2   MSG9075

## (undated) DEVICE — SYRINGE MED 50ML LUERLOCK TIP

## (undated) DEVICE — SOLUTION IRRIG 1000ML PENTALYTE PLAS POUR BTL TIS U SOL

## (undated) DEVICE — SOLUTION PREP POVIDONE IOD FOR SKIN MUCOUS MEM PRIOR TO

## (undated) DEVICE — NEEDLE HYPO 27GA L1.25IN GRY POLYPR HUB S STL REG BVL STR

## (undated) DEVICE — 25GA EZPASS SOFT TIP CANNULA BOX OF 5: Brand: VORTEX SURGICAL INC

## (undated) DEVICE — 25GA RETRACTABLE ILM MEMBRANE ELEVATOR BOX OF 5: Brand: VORTEX SURGICAL INC

## (undated) DEVICE — NEEDLE HYPO 30GA L0.5IN BGE POLYPR HUB S STL REG BVL STR

## (undated) DEVICE — 1 EACH 40411 STERILE DISPOSABLE SUPER VIEW® LENS SET & 1 EACH 40100 STERILE MICROSCOPE DRAPE: Brand: SUPER VIEW® PACK

## (undated) DEVICE — SYRINGE MED 1ML POLYCARB LUERLOCK HUB

## (undated) DEVICE — SURGICAL PROCEDURE PACK 25 GA VITRECTOMY

## (undated) DEVICE — RETINA PK

## (undated) DEVICE — NEEDLE HYPO 27GA L0.5IN GRY POLYPR HUB S STL REG BVL STR

## (undated) DEVICE — SYRINGE ST FILTERS W/MCE MEMBRAN

## (undated) DEVICE — PROBE OPHTH LSR STR STRL 25GA

## (undated) DEVICE — SOLUTION SCRB 4OZ 10% POVIDONE IOD ANTIMIC BTL

## (undated) DEVICE — STANDARD HYPODERMIC NEEDLE,ALUMINUM HUB: Brand: MONOJECT

## (undated) DEVICE — SYRINGE MED 10ML LUERLOCK TIP W/O SFTY DISP

## (undated) DEVICE — SYRINGE MED 5ML STD CLR PLAS LUERLOCK TIP N CTRL DISP

## (undated) DEVICE — SYRINGE, LUER LOCK, 10ML: Brand: MEDLINE

## (undated) DEVICE — OCCUCOAT SYRINGE 1ML 6PK: Brand: OCCUCOAT